# Patient Record
(demographics unavailable — no encounter records)

---

## 2025-02-14 NOTE — ASSESSMENT
[FreeTextEntry1] : 33-year-old male for evaluation status post right hand injury that which occurred at work in early January.  He reports he had a blunt trauma to the dorsal aspect of his hand/knuckles.  He has been experiencing persistent pain since time of injury without relief.  Physical examination of the right hand: Mild swelling appreciated of the second metacarpal head/neck.  Full range of motion.  Can fully flex and extend.  Good strength wrap.  Sensation is intact distally.  Neuro vas intact.  No extensor lag.  No malrotation or deviations appreciated.  X-rays of the right hand were taken in the office today:  No acute fractures, subluxations, or dislocations.  Treatment plan as discussed:  The patient likely has a contusion of the hand given the patient's history, physical examination findings, and x-ray findings. The patient understands that bone contusions take 4 to 6 weeks to fully heal.  Patient also understands that the first 2 weeks are the worst in terms of pain and swelling.   I recommended anti-inflammatory medication. Patient agrees to taking Advil/Ibuprofen OTC as needed for pain. Benefits discussed. Confirmed no contraindication to NSAIDs.   I recommended patient rest, ice, compress, and elevate the hand regularly. Encouraged activity modification as tolerable. Encouraged gentle range of motion to avoid stiffness. No gym /sports at least until further evaluation.   All questions and concerns addressed to patient's satisfaction. Patient expresses full understanding of treatment plan. Patient will follow up in 4 weeks for repeat evaluation and treatment.  I will send the patient for therapy.  If is not proving we will send for an MRI.

## 2025-03-20 NOTE — ASSESSMENT
[FreeTextEntry1] : 33-year-old male for evaluation status post right hand injury that which occurred at work in early January.  He reports he had a blunt trauma to the dorsal aspect of his hand/knuckles.  He has been experiencing persistent pain since time of injury without relief.  Physical examination of the right hand: Mild swelling appreciated of the second metacarpal head/neck.  Full range of motion.  Can fully flex and extend.  Good strength wrap.  Sensation is intact distally.  Neuro vas intact.  No extensor lag.  No malrotation or deviations appreciated.  X-rays of the right hand were taken in the office today:  No acute fractures, subluxations, or dislocations.  Treatment plan as discussed:  The patient likely has a contusion of the hand given the patient's history, physical examination findings, and x-ray findings. The patient understands that bone contusions take 4 to 6 weeks to fully heal.  Patient also understands that the first 2 weeks are the worst in terms of pain and swelling.   I recommended anti-inflammatory medication. Patient agrees to taking Advil/Ibuprofen OTC as needed for pain. Benefits discussed. Confirmed no contraindication to NSAIDs.   I recommended patient rest, ice, compress, and elevate the hand regularly. Encouraged activity modification as tolerable. Encouraged gentle range of motion to avoid stiffness. No gym /sports at least until further evaluation.   All questions and concerns addressed to patient's satisfaction. Patient expresses full understanding of treatment plan. Patient will follow up in 2 weeks for repeat evaluation.  He will remain 100% temporarily totally disabled from work at this time.  I am recommending another 2 weeks of recovery.

## 2025-04-03 NOTE — ASSESSMENT
[FreeTextEntry1] : 33-year-old male for evaluation status post right hand injury that which occurred at work in early January.  He reports he had a blunt trauma to the dorsal aspect of his hand/knuckles.  He is improving but having mild pain persistently.  Physical examination of the right hand: Mild swelling appreciated of the second metacarpal head/neck.  Full range of motion.  Can fully flex and extend.  Good strength wrap.  Sensation is intact distally.  Neuro vas intact.  No extensor lag.  No malrotation or deviations appreciated.  X-rays of the right hand were taken in the office today:  No acute fractures, subluxations, or dislocations.  Treatment plan as discussed:  The patient likely has a contusion of the hand given the patient's history, physical examination findings, and x-ray findings. The patient understands that bone contusions take 4 to 6 weeks to fully heal.  Patient also understands that the first 2 weeks are the worst in terms of pain and swelling.   I recommended anti-inflammatory medication. Patient agrees to taking Advil/Ibuprofen OTC as needed for pain. Benefits discussed. Confirmed no contraindication to NSAIDs.   I recommended patient rest, ice, compress, and elevate the hand regularly. Encouraged activity modification as tolerable. Encouraged gentle range of motion to avoid stiffness. No gym /sports at least until further evaluation.   All questions and concerns addressed to patient's satisfaction. Patient expresses full understanding of treatment plan. Patient will follow up in 2 weeks for repeat evaluation.  He will remain 100% temporarily totally disabled from work at this time.  I am recommending another 2 weeks of recovery.

## 2025-04-25 NOTE — ASSESSMENT
[FreeTextEntry1] : 33-year-old male for evaluation status post right hand injury that which occurred at work in early January.  He reports he had a blunt trauma to the dorsal aspect of his hand/knuckles.  Patient is having persistent pain and swelling.  He is interested in an MRI.  Physical examination of the right hand: Mild swelling appreciated of the second metacarpal head/neck.  Full range of motion.  Tender palpation along the second metacarpal head/neck.  Can fully flex and extend.  Good strength throughout.  Sensation is intact distally.  Neuro vas intact.  No extensor lag.  No malrotation or deviations appreciated.  X-rays of the right hand were taken in the office today:  No acute fractures, subluxations, or dislocations.  Treatment plan as discussed:  The patient likely has a contusion of the hand given the patient's history, physical examination findings, and x-ray findings. The patient understands that bone contusions take 4 to 6 weeks to fully heal.  Patient also understands that the first 2 weeks are the worst in terms of pain and swelling.  Given he has very persistent pain and swelling, I will send patient for an MRI to further evaluate for internal derangement.   I recommended anti-inflammatory medication. Patient agrees to taking Advil/Ibuprofen OTC as needed for pain. Benefits discussed. Confirmed no contraindication to NSAIDs.   I recommended patient rest, ice, compress, and elevate the hand regularly. Encouraged activity modification as tolerable. Encouraged gentle range of motion to avoid stiffness. No gym /sports at least until further evaluation.   All questions and concerns addressed to patient's satisfaction. Patient expresses full understanding of treatment plan. Patient will follow up in 2 weeks for repeat evaluation.  He will remain 100% temporarily totally disabled from work at this time.  I am recommending another 2 weeks of recovery.

## 2025-07-29 NOTE — HISTORY OF PRESENT ILLNESS
[de-identified] : Patient is a 34-year-old male here for reevaluation right hand injury which occurred at work in early January.  At that time patient did have a blunt trauma to the dorsal aspect of the hand/knuckles.  Patient states that he is still having discomfort of the area of the injury.  Previously an MRI was ordered in April by JUANJ Dumont, patient did not go for MRI.  Patient here for continued evaluation interested in MRI at this point  Right hand exam: Minimal swelling appreciated at the second metacarpal head, full range of motion of hand good strength throughout, tenderness to palpation along the second metacarpal head/neck, neurovascular intact throughout motor and sensory intact no extension lag's no malrotation or deviations  Reviewed prior x-rays right hand from chart showing no acute fractures, subluxations, dislocations  Due to patient's continued discomfort MRI warranted for further evaluation.  Otherwise, patient will continue with treatment plan including activity modification range of motion exercises, over-the-counter Motrin/Tylenol as needed for pain.  Will follow-up with Dr. Slater after MRI